# Patient Record
Sex: FEMALE | Race: BLACK OR AFRICAN AMERICAN | NOT HISPANIC OR LATINO | Employment: FULL TIME | URBAN - METROPOLITAN AREA
[De-identification: names, ages, dates, MRNs, and addresses within clinical notes are randomized per-mention and may not be internally consistent; named-entity substitution may affect disease eponyms.]

---

## 2023-03-01 ENCOUNTER — APPOINTMENT (OUTPATIENT)
Dept: MEDICAL GROUP | Facility: MEDICAL CENTER | Age: 63
End: 2023-03-01

## 2023-03-01 ENCOUNTER — OFFICE VISIT (OUTPATIENT)
Dept: MEDICAL GROUP | Facility: MEDICAL CENTER | Age: 63
End: 2023-03-01

## 2023-03-01 VITALS
HEIGHT: 66 IN | BODY MASS INDEX: 25.55 KG/M2 | WEIGHT: 159 LBS | OXYGEN SATURATION: 99 % | SYSTOLIC BLOOD PRESSURE: 124 MMHG | RESPIRATION RATE: 14 BRPM | TEMPERATURE: 98 F | HEART RATE: 68 BPM | DIASTOLIC BLOOD PRESSURE: 78 MMHG

## 2023-03-01 DIAGNOSIS — F32.A DEPRESSION, UNSPECIFIED DEPRESSION TYPE: ICD-10-CM

## 2023-03-01 DIAGNOSIS — E78.5 HYPERLIPIDEMIA, UNSPECIFIED HYPERLIPIDEMIA TYPE: ICD-10-CM

## 2023-03-01 DIAGNOSIS — I10 HYPERTENSION, UNSPECIFIED TYPE: ICD-10-CM

## 2023-03-01 DIAGNOSIS — G47.09 OTHER INSOMNIA: ICD-10-CM

## 2023-03-01 PROCEDURE — 99203 OFFICE O/P NEW LOW 30 MIN: CPT | Performed by: STUDENT IN AN ORGANIZED HEALTH CARE EDUCATION/TRAINING PROGRAM

## 2023-03-01 RX ORDER — ATENOLOL AND CHLORTHALIDONE TABLET 100; 25 MG/1; MG/1
1 TABLET ORAL DAILY
Qty: 90 TABLET | Refills: 3 | Status: SHIPPED | OUTPATIENT
Start: 2023-03-01 | End: 2023-05-30

## 2023-03-01 RX ORDER — TRAZODONE HYDROCHLORIDE 50 MG/1
50 TABLET ORAL NIGHTLY
Qty: 30 TABLET | Refills: 0 | Status: SHIPPED | OUTPATIENT
Start: 2023-03-01 | End: 2023-05-30

## 2023-03-01 RX ORDER — ATENOLOL AND CHLORTHALIDONE TABLET 100; 25 MG/1; MG/1
1 TABLET ORAL DAILY
COMMUNITY
End: 2023-03-01 | Stop reason: SDUPTHER

## 2023-03-01 RX ORDER — PAROXETINE HYDROCHLORIDE 40 MG/1
40 TABLET, FILM COATED ORAL DAILY
Qty: 90 TABLET | Refills: 3 | Status: SHIPPED | OUTPATIENT
Start: 2023-03-01 | End: 2023-05-30

## 2023-03-01 RX ORDER — SIMVASTATIN 20 MG
20 TABLET ORAL NIGHTLY
COMMUNITY
End: 2023-03-01 | Stop reason: SDUPTHER

## 2023-03-01 RX ORDER — PAROXETINE HYDROCHLORIDE 40 MG/1
40 TABLET, FILM COATED ORAL DAILY
COMMUNITY
End: 2023-03-01 | Stop reason: SDUPTHER

## 2023-03-01 RX ORDER — SIMVASTATIN 20 MG
20 TABLET ORAL NIGHTLY
Qty: 90 TABLET | Refills: 3 | Status: SHIPPED | OUTPATIENT
Start: 2023-03-01 | End: 2023-05-30

## 2023-03-01 RX ORDER — ERGOCALCIFEROL 1.25 MG/1
CAPSULE ORAL
COMMUNITY

## 2023-03-01 RX ORDER — TRAZODONE HYDROCHLORIDE 50 MG/1
50 TABLET ORAL NIGHTLY
COMMUNITY
End: 2023-03-01 | Stop reason: SDUPTHER

## 2023-03-01 ASSESSMENT — PATIENT HEALTH QUESTIONNAIRE - PHQ9: CLINICAL INTERPRETATION OF PHQ2 SCORE: 0

## 2023-03-01 ASSESSMENT — ENCOUNTER SYMPTOMS
VOMITING: 0
FOCAL WEAKNESS: 0
CHILLS: 0
ABDOMINAL PAIN: 0
FEVER: 0
SHORTNESS OF BREATH: 0
NAUSEA: 0
COUGH: 0

## 2023-03-01 NOTE — PROGRESS NOTES
"Subjective:     CC:  Diagnoses of Hypertension, unspecified type, Depression, unspecified depression type, Hyperlipidemia, unspecified hyperlipidemia type, and Other insomnia were pertinent to this visit.    HISTORY OF THE PRESENT ILLNESS: Patient is a 62 y.o. female. This pleasant patient is here today to establish care and discuss medication refill. His/her prior PCP was Dr. Juárez in [place]. Patient moved to [place] in December 2022.    Patient initially came to establish care. Unfortunately [facility] did not accept her insurance. We will refill her medications.    Problem   Htn (Hypertension)    Chronic, stable     Depression    Chronic, was diagnosed 32 years. On Paroxetine 40 mg dialy.     Hld (Hyperlipidemia)    Chronic, stable.     Other Insomnia    Chronic, stable       ROS:   Review of Systems   Constitutional:  Negative for chills and fever.   Respiratory:  Negative for cough and shortness of breath.    Cardiovascular:  Negative for chest pain and leg swelling.   Gastrointestinal:  Negative for abdominal pain, nausea and vomiting.   Neurological:  Negative for focal weakness.       Objective:     Exam: /78 (BP Location: Right arm, Patient Position: Sitting, BP Cuff Size: Adult)   Pulse 68   Temp 36.7 °C (98 °F) (Temporal)   Resp 14   Ht 1.676 m (5' 6\")   Wt 72.1 kg (159 lb)   SpO2 99%  Body mass index is 25.66 kg/m².    Physical Exam  Vitals reviewed.   HENT:      Head: Normocephalic.      Mouth/Throat:      Mouth: Mucous membranes are moist.      Pharynx: Oropharynx is clear.   Eyes:      Pupils: Pupils are equal, round, and reactive to light.   Cardiovascular:      Rate and Rhythm: Normal rate and regular rhythm.   Pulmonary:      Effort: Pulmonary effort is normal. No respiratory distress.      Breath sounds: No wheezing or rales.   Abdominal:      General: Abdomen is flat. Bowel sounds are normal. There is no distension.      Tenderness: There is no abdominal tenderness. There is no guarding. "   Skin:     General: Skin is warm.   Neurological:      General: No focal deficit present.      Mental Status: She is alert and oriented to person, place, and time.       Assessment & Plan:   62 y.o. female with the following -    1. Hypertension, unspecified type  Chronic, stable.  - atenolol-chlorthalidone (TENORETIC) 100-25 MG per tablet; Take 1 Tablet by mouth every day for 90 days.  Dispense: 90 Tablet; Refill: 3    2. Depression, unspecified depression type  Chronic, stable.  - paroxetine (PAXIL) 40 MG tablet; Take 1 Tablet by mouth every day for 90 days.  Dispense: 90 Tablet; Refill: 3    3. Hyperlipidemia, unspecified hyperlipidemia type  Chronic, stable.  - simvastatin (ZOCOR) 20 MG Tab; Take 1 Tablet by mouth every evening for 90 days.  Dispense: 90 Tablet; Refill: 3    4. Other insomnia  Chronic, stable.  - traZODone (DESYREL) 50 MG Tab; Take 1 Tablet by mouth every evening for 90 days.  Dispense: 30 Tablet; Refill: 0    Return for follow up with PCP.    Please note that this dictation was created using voice recognition software. I have made every reasonable attempt to correct obvious errors, but I expect that there are errors of grammar and possibly content that I did not discover before finalizing the note.

## 2023-11-02 ENCOUNTER — DOCUMENTATION (OUTPATIENT)
Dept: HEALTH INFORMATION MANAGEMENT | Facility: OTHER | Age: 63
End: 2023-11-02
Payer: MEDICARE

## 2024-01-07 ENCOUNTER — HOSPITAL ENCOUNTER (EMERGENCY)
Facility: MEDICAL CENTER | Age: 64
End: 2024-01-07
Attending: STUDENT IN AN ORGANIZED HEALTH CARE EDUCATION/TRAINING PROGRAM
Payer: MEDICARE

## 2024-01-07 ENCOUNTER — APPOINTMENT (OUTPATIENT)
Dept: RADIOLOGY | Facility: MEDICAL CENTER | Age: 64
End: 2024-01-07
Attending: STUDENT IN AN ORGANIZED HEALTH CARE EDUCATION/TRAINING PROGRAM
Payer: MEDICARE

## 2024-01-07 VITALS
HEART RATE: 76 BPM | RESPIRATION RATE: 17 BRPM | DIASTOLIC BLOOD PRESSURE: 89 MMHG | WEIGHT: 165 LBS | OXYGEN SATURATION: 98 % | TEMPERATURE: 97.9 F | BODY MASS INDEX: 26.63 KG/M2 | SYSTOLIC BLOOD PRESSURE: 125 MMHG

## 2024-01-07 DIAGNOSIS — M79.651 RIGHT THIGH PAIN: ICD-10-CM
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
## 2024-01-07 PROCEDURE — 73502 X-RAY EXAM HIP UNI 2-3 VIEWS: CPT | Mod: RT

## 2024-01-07 PROCEDURE — 99284 EMERGENCY DEPT VISIT MOD MDM: CPT

## 2024-01-07 PROCEDURE — 700102 HCHG RX REV CODE 250 W/ 637 OVERRIDE(OP): Performed by: STUDENT IN AN ORGANIZED HEALTH CARE EDUCATION/TRAINING PROGRAM

## 2024-01-07 PROCEDURE — A9270 NON-COVERED ITEM OR SERVICE: HCPCS | Performed by: STUDENT IN AN ORGANIZED HEALTH CARE EDUCATION/TRAINING PROGRAM

## 2024-01-07 RX ORDER — ACETAMINOPHEN 325 MG/1
650 TABLET ORAL ONCE
Status: COMPLETED | OUTPATIENT
Start: 2024-01-07 | End: 2024-01-07

## 2024-01-07 RX ADMIN — ACETAMINOPHEN 650 MG: 325 TABLET, FILM COATED ORAL at 21:02

## 2024-01-07 ASSESSMENT — PAIN DESCRIPTION - PAIN TYPE
TYPE: ACUTE PAIN
TYPE: ACUTE PAIN

## 2024-01-08 NOTE — ED PROVIDER NOTES
ED Provider Note    CHIEF COMPLAINT  Chief Complaint   Patient presents with    GLF     BIB [facility] for GLF from falling on ice. Patient reports right hip pain. No obvious deformity. No head strike, no thinners.  Patient able to move extremity. Patient received 100 fentanyl en route.        EXTERNAL RECORDS REVIEWED  Outpatient primary care note reviewed for medical history    HPI/ROS  LIMITATION TO HISTORY   none  OUTSIDE HISTORIAN(S):  none    Michelle Liu is a 63 y.o. female with no pertinent past medical history presenting to the emergency department after a ground-level fall.  Patient says that she slipped on ice at the grocery store, fell onto her right hip.  Complaining of right hip pain.  Has been able to ambulate since the accident during the head.  No loss of consciousness.  No neck pain.  Not on anticoagulation.  Denies any other pain.  Patient was ambulatory after the fall  No numbness weakness tingling in arms or her legs    PAST MEDICAL HISTORY       SURGICAL HISTORY  patient denies any surgical history    FAMILY HISTORY  History reviewed. No pertinent family history.    SOCIAL HISTORY  Social History     Tobacco Use    Smoking status: Never    Smokeless tobacco: Never   Vaping Use    Vaping Use: Never used   Substance and Sexual Activity    Alcohol use: Not Currently    Drug use: Never    Sexual activity: Not on file       CURRENT MEDICATIONS  Home Medications       Reviewed by Genesis Garcia R.N. (Registered Nurse) on 01/07/24 at 2011  Med List Status: Partial     Medication Last Dose Status   vitamin D2, Ergocalciferol, (DRISDOL) 1.25 MG (56155 UT) Cap capsule  Active                    ALLERGIES  No Known Allergies    PHYSICAL EXAM  VITAL SIGNS: BP (!) 133/91   Pulse 78   Temp 36.9 °C (98.4 °F) (Temporal)   Resp 16   Wt 74.8 kg (165 lb)   SpO2 96%   BMI 26.63 kg/m²    General: Well- appearing , non-toxic, no acute distress  Neuro: GCS 15, moving all extremities  HEENT:   - Head:  Normocephalic, atraumatic  - Eyes: PERRL, no periorbital ecchymosis  - Midface: Atraumatic and stable  - Ears/Nose: normal external nose and ears, no retroauricular ecchymosis  - Mouth: moist mucosal membranes, atraumatic  Neck: No midline tenderness palpation, full range of motion in lateral rotation, flexion, extension  Chest: Atraumatic, no crepitus or tenderness palpation  Resp: clear to auscultation, no increased work of breathing  CV: Regular rate and rhythm, perfusing well  Abd: Soft, non-tender, non-distended  Pelvis: Stable on anterior posterior compression  Extremities:   RUE: Atraumatic, nontender to palpation, 2+ radial pulse, SILT, strength intact  LUE: Atraumatic, nontender to palpation, 2+ radial pulse, SILT, strength intact  RLE: Mild right lateral hip tenderness to palpation, 2+ DP pulse, SILT, strength intact  LLE: Atraumatic, nontender to palpation, 2+ DP pulse, SILT, strength intact  Back: Atraumatic, no midline tenderness palpation    DIAGNOSTIC STUDIES / PROCEDURES    EKG  My independent EKG interpretation:  No results found for this or any previous visit.    LABS  No results found for this or any previous visit.    RADIOLOGY  I have independently interpreted the diagnostic imaging associated with this visit and am waiting the final reading from the radiologist.   My preliminary interpretation is as follows:   -   Radiologist interpretation:   DX-HIP-COMPLETE - UNILATERAL 2+ RIGHT   Final Result         1.  No radiographic evidence of acute traumatic injury.   2.  Atherosclerosis              MEDICAL DECISION MAKING    ED Observation Status? No; Patient does not meet criteria for ED Observation.     ED COURSE AND PLAN    Michelle Liu is a 63 y.o. female presenting to the emergency department after a ground-level fall.  Complaining of right sided hip pain.  Patient was ambulatory after the accident.  It is neuro vastly intact.  Plain film of the right hip shows no evidence of femur or pelvic  fracture.  Relatively low pretest probability for associated fracture given patient was ambulatory, no indication for advanced imaging, CT, MRI.   Considered but no indication for advanced imaging of the head or cervical spine per [place] CT head C-spine criteria  Is ambulatory and appropriate for discharge home, strict return precaution discussed.      ---Pertinent ED Course---:    12:16 AM I reviewed the patient's old records in Epic, medication list, allergies, past medical history and performed a physical examination.                 Procedures:      ----------------------------------------------------------------------------------  DISCUSSIONS    I have discussed management of the patient with the following physicians and ROSE's:      Discussion of management with other [facility] or appropriate source(s):     Escalation of care considered, and ultimately not performed: Considered but no indication for CT, MRI of the hip     Barriers to care at this time, including but not limited to:     Decision tools and prescription drugs considered including, but not limited to: [place] CT head, cervical spine criteria as described above    FINAL IMPRESSION    1. Fall, initial encounter    2. Right thigh pain          DISPOSITION    Discharge home, Stable        This chart was dictated using an electronic voice recognition software. The chart has been reviewed and edited but there is still possibility for dictation errors due to limitation of software.    Malcolm Frances,  1/8/2024

## 2024-01-08 NOTE — ED TRIAGE NOTES
Chief Complaint   Patient presents with    GLF     BIB [facility] for GLF from falling on ice. Patient reports right hip pain. No obvious deformity. No head strike, no thinners.  Patient able to move extremity. Patient received 100 fentanyl en route.      Vitals:    01/07/24 2009   BP: (!) 133/91   Pulse: 78   Resp: 16   Temp: 36.9 °C (98.4 °F)   SpO2: 96%

## 2024-01-13 ENCOUNTER — APPOINTMENT (OUTPATIENT)
Dept: URGENT CARE | Facility: CLINIC | Age: 64
End: 2024-01-13
Payer: MEDICARE